# Patient Record
Sex: MALE | Race: WHITE | NOT HISPANIC OR LATINO | Employment: FULL TIME | URBAN - METROPOLITAN AREA
[De-identification: names, ages, dates, MRNs, and addresses within clinical notes are randomized per-mention and may not be internally consistent; named-entity substitution may affect disease eponyms.]

---

## 2022-06-25 ENCOUNTER — OFFICE VISIT (OUTPATIENT)
Dept: URGENT CARE | Facility: CLINIC | Age: 27
End: 2022-06-25
Payer: COMMERCIAL

## 2022-06-25 VITALS
WEIGHT: 212 LBS | HEIGHT: 72 IN | OXYGEN SATURATION: 99 % | DIASTOLIC BLOOD PRESSURE: 72 MMHG | HEART RATE: 61 BPM | TEMPERATURE: 97.8 F | BODY MASS INDEX: 28.71 KG/M2 | SYSTOLIC BLOOD PRESSURE: 119 MMHG | RESPIRATION RATE: 20 BRPM

## 2022-06-25 DIAGNOSIS — L25.5 DERMATITIS DUE TO PLANTS, INCLUDING POISON IVY, SUMAC, AND OAK: Primary | ICD-10-CM

## 2022-06-25 PROCEDURE — 99213 OFFICE O/P EST LOW 20 MIN: CPT | Performed by: PHYSICIAN ASSISTANT

## 2022-06-25 RX ORDER — OXCARBAZEPINE 150 MG/1
TABLET, FILM COATED ORAL
COMMUNITY
Start: 2022-06-21

## 2022-06-25 RX ORDER — ESCITALOPRAM OXALATE 5 MG/1
TABLET ORAL
COMMUNITY
Start: 2022-06-21

## 2022-06-25 RX ORDER — PREDNISONE 50 MG/1
50 TABLET ORAL DAILY
Qty: 5 TABLET | Refills: 0 | Status: SHIPPED | OUTPATIENT
Start: 2022-06-25 | End: 2022-06-25 | Stop reason: SDUPTHER

## 2022-06-25 RX ORDER — SERTRALINE HYDROCHLORIDE 100 MG/1
TABLET, FILM COATED ORAL
COMMUNITY
Start: 2022-06-01

## 2022-06-25 RX ORDER — PREDNISONE 50 MG/1
50 TABLET ORAL DAILY
Qty: 5 TABLET | Refills: 0 | Status: SHIPPED | OUTPATIENT
Start: 2022-06-25 | End: 2022-06-30

## 2022-06-25 NOTE — PROGRESS NOTES
[facility]        NAME: Glen Hemphill is a 32 y o  male  : 1995    MRN: [id number]  DATE: 2022  TIME: 12:16 PM    Assessment and Plan   Dermatitis due to plants, including poison ivy, sumac, and oak [L25 5]  1  Dermatitis due to plants, including poison ivy, sumac, and oak  predniSONE 50 mg tablet     Patient Instructions   Poison ivy, oak or sumac  rx prednisone once daily x 5 days sent via EMR  Can continue oral benadryl as needed  Also recommend over the counter products: calamine lotion, benadryl cream, or other similar products topically as needed  Cool compress for comfort  Wash all linens/clothes in hot water    Follow up with PCP in 3-5 days  Proceed to  ER if symptoms worsen  Chief Complaint     Chief Complaint   Patient presents with    Rash     Poison ivy rash started approx 4 days ago  History of Present Illness       Isaura Iyer is a 12-year-old male who presents to the clinic complaining of red itchy rash x6 days  He was outside over the past weekend and after 2 days developed a red itchy rash on his right ankle of bilateral thighs  He has been using over-the-counter stuff with only mild relief  He area on his right medial ankle is weeping and the worst of his lesions  He denies any fever or chills  Review of Systems   Review of Systems   Constitutional: Negative for chills and fever  Skin: Positive for color change and rash           Current Medications       Current Outpatient Medications:     predniSONE 50 mg tablet, Take 1 tablet (50 mg total) by mouth daily for 5 days, Disp: 5 tablet, Rfl: 0    escitalopram (LEXAPRO) 5 mg tablet, , Disp: , Rfl:     mupirocin (BACTROBAN) 2 % ointment, , Disp: , Rfl:     OXcarbazepine (TRILEPTAL) 150 mg tablet, , Disp: , Rfl:     sertraline (ZOLOFT) 100 mg tablet, , Disp: , Rfl:     sertraline (ZOLOFT) 50 mg tablet, , Disp: , Rfl:     Current Allergies     Allergies as of 2022 - Reviewed 2022   Allergen Reaction Noted    Amoxicillin-pot clavulanate Diarrhea 12/04/2019            The following portions of the patient's history were reviewed and updated as appropriate: allergies, current medications, past family history, past medical history, past social history, past surgical history and problem list      Past Medical History:   Diagnosis Date    Anxiety     Depression        History reviewed  No pertinent surgical history  History reviewed  No pertinent family history  Medications have been verified  Objective   /72   Pulse 61   Temp 97 8 °F (36 6 °C)   Resp 20   Ht 6' (1 829 m)   Wt 96 2 kg (212 lb)   SpO2 99%   BMI 28 75 kg/m²   No LMP for male patient  Physical Exam     Physical Exam  Vitals and nursing note reviewed  Constitutional:       General: He is not in acute distress  Appearance: Normal appearance  He is not ill-appearing  Cardiovascular:      Rate and Rhythm: Normal rate and regular rhythm  Heart sounds: Normal heart sounds  Pulmonary:      Effort: Pulmonary effort is normal       Breath sounds: Normal breath sounds  Skin:     Findings: Rash present  Rash is macular and vesicular  Rash is not crusting, nodular, purpuric, pustular, scaling or urticarial           Neurological:      Mental Status: He is alert and oriented to person, place, and time     Psychiatric:         Mood and Affect: Mood normal          Behavior: Behavior normal

## 2024-03-03 ENCOUNTER — OFFICE VISIT (OUTPATIENT)
Dept: URGENT CARE | Facility: CLINIC | Age: 29
End: 2024-03-03
Payer: COMMERCIAL

## 2024-03-03 VITALS
OXYGEN SATURATION: 99 % | RESPIRATION RATE: 18 BRPM | DIASTOLIC BLOOD PRESSURE: 75 MMHG | SYSTOLIC BLOOD PRESSURE: 117 MMHG | BODY MASS INDEX: 26.99 KG/M2 | WEIGHT: 199 LBS | HEART RATE: 64 BPM | TEMPERATURE: 97.3 F

## 2024-03-03 DIAGNOSIS — R31.9 HEMATURIA, UNSPECIFIED TYPE: Primary | ICD-10-CM

## 2024-03-03 PROCEDURE — 87086 URINE CULTURE/COLONY COUNT: CPT | Performed by: PHYSICIAN ASSISTANT

## 2024-03-03 PROCEDURE — 99213 OFFICE O/P EST LOW 20 MIN: CPT | Performed by: PHYSICIAN ASSISTANT

## 2024-03-03 NOTE — PROGRESS NOTES
[facility]        NAME: Eric Calzada is a 29 y.o. male  : 1995    MRN: [id number]  DATE: March 3, 2024  TIME: 3:27 PM    Assessment and Plan   Hematuria, unspecified type [R31.9]  1. Hematuria, unspecified type          Patient Instructions   Painless hematuria  UA dip- hematuria, will send out culture and call if abnormal  If negative and hematuria continues recommend f/u with PCP or urologist    Follow up with PCP in 3-5 days.  Proceed to  ER if symptoms worsen.    If tests have been performed at [facility], our office will contact you with results if changes need to be made to the care plan discussed with you at the visit.  [facility]    Chief Complaint     Chief Complaint   Patient presents with    Possible UTI     Urgency, frequency and hematuria began today         History of Present Illness       Eric is a 29-year-old male who presents to clinic complaining of hematuria that started this morning.  He also notes increased urinary frequency and urinary urgency.  He denies any dysuria, fever, chills, abdominal pain, back pain, flank pain, back pain, or penile discharge.  He is sexually active with 1 partner and denies any prescription STD.        Review of Systems   Review of Systems   Constitutional:  Negative for chills and fever.   Gastrointestinal:  Negative for abdominal pain.   Genitourinary:  Positive for frequency, hematuria and urgency. Negative for dysuria, penile discharge and testicular pain.   Musculoskeletal:  Negative for back pain.         Current Medications       Current Outpatient Medications:     escitalopram (LEXAPRO) 5 mg tablet, , Disp: , Rfl:     mupirocin (BACTROBAN) 2 % ointment, , Disp: , Rfl:     OXcarbazepine (TRILEPTAL) 150 mg tablet, , Disp: , Rfl:     sertraline (ZOLOFT) 100 mg tablet, , Disp: , Rfl:     sertraline (ZOLOFT) 50 mg tablet, , Disp: , Rfl:     Current Allergies     Allergies as of 2024 - Reviewed  03/03/2024   Allergen Reaction Noted    Amoxicillin-pot clavulanate Diarrhea 12/04/2019            The following portions of the patient's history were reviewed and updated as appropriate: allergies, current medications, past family history, past medical history, past social history, past surgical history and problem list.     Past Medical History:   Diagnosis Date    Anxiety     Depression        No past surgical history on file.    No family history on file.      Medications have been verified.        Objective   /75   Pulse 64   Temp (!) 97.3 °F (36.3 °C)   Resp 18   Wt 90.3 kg (199 lb)   SpO2 99%   BMI 26.99 kg/m²   No LMP for male patient.       Physical Exam     Physical Exam  Vitals and nursing note reviewed.   Constitutional:       General: He is not in acute distress.     Appearance: Normal appearance. He is not ill-appearing.   Cardiovascular:      Rate and Rhythm: Normal rate and regular rhythm.      Heart sounds: Normal heart sounds.   Pulmonary:      Effort: Pulmonary effort is normal.      Breath sounds: Normal breath sounds.   Abdominal:      General: Bowel sounds are normal. There is no distension.      Palpations: Abdomen is soft.      Tenderness: There is no abdominal tenderness. There is no right CVA tenderness, left CVA tenderness, guarding or rebound.   Neurological:      Mental Status: He is alert and oriented to person, place, and time.   Psychiatric:         Mood and Affect: Mood normal.         Behavior: Behavior normal.

## 2024-03-04 LAB — BACTERIA UR CULT: NORMAL
